# Patient Record
Sex: MALE | Race: WHITE | NOT HISPANIC OR LATINO | Employment: FULL TIME | URBAN - METROPOLITAN AREA
[De-identification: names, ages, dates, MRNs, and addresses within clinical notes are randomized per-mention and may not be internally consistent; named-entity substitution may affect disease eponyms.]

---

## 2022-08-30 ENCOUNTER — HOSPITAL ENCOUNTER (EMERGENCY)
Facility: CLINIC | Age: 27
Discharge: HOME | End: 2022-08-30
Attending: EMERGENCY MEDICINE | Admitting: EMERGENCY MEDICINE
Payer: COMMERCIAL

## 2022-08-30 VITALS
HEART RATE: 65 BPM | TEMPERATURE: 98.2 F | OXYGEN SATURATION: 100 % | DIASTOLIC BLOOD PRESSURE: 57 MMHG | RESPIRATION RATE: 18 BRPM | SYSTOLIC BLOOD PRESSURE: 124 MMHG

## 2022-08-30 DIAGNOSIS — S61.215A LACERATION OF LEFT RING FINGER WITHOUT FOREIGN BODY WITHOUT DAMAGE TO NAIL, INITIAL ENCOUNTER: ICD-10-CM

## 2022-08-30 PROCEDURE — 99283 EMERGENCY DEPT VISIT LOW MDM: CPT

## 2022-08-30 PROCEDURE — 12001 RPR S/N/AX/GEN/TRNK 2.5CM/<: CPT

## 2022-08-30 RX ORDER — LIDOCAINE HYDROCHLORIDE AND EPINEPHRINE 10; 10 MG/ML; UG/ML
INJECTION, SOLUTION INFILTRATION; PERINEURAL
Status: DISCONTINUED
Start: 2022-08-30 | End: 2022-08-30 | Stop reason: HOSPADM

## 2022-08-30 ASSESSMENT — ACTIVITIES OF DAILY LIVING (ADL): ADLS_ACUITY_SCORE: 35

## 2022-08-30 ASSESSMENT — ENCOUNTER SYMPTOMS: WOUND: 1

## 2022-08-30 NOTE — DISCHARGE INSTRUCTIONS
Please make an appointment to follow up with your primary care provider or the emergency department in 7 days for wound check and suture removal.     Return to ER immediately if you develop: signs of a wound infection (RED/SWOLLEN/DRAINS PUS LIKE A PIMPLE) OR you have any other concerns about your health.    Antibiotic ointment x 5 days         Discharge Instructions  Laceration (Cut)    You were seen today for a laceration (cut).  Your doctor examined your laceration for any problems such a buried foreign body (like glass, a splinter, or gravel), or injury to blood vessels, tendons, and nerves.  Your doctor may have also rinsed and/or scrubbed your laceration to help prevent an infection.  Your laceration may have been closed with glue, staples or sutures (stitches).      It may not be possible to find all problems with your laceration on the first visit, and we can't always prevent infections.  Antibiotics are only given when the benefit is more than the risk, and don't prevent all infections. Some lacerations are too high risk to close, and are left open to heal.  All lacerations, no matter how expertly repaired, will cause scarring.    Return to the Emergency Department right away if:  You have more redness, swelling, pain, drainage (pus), a bad smell, or red streaking from your laceration.    You have a fever of 101oF or more.  You have bleeding that you can t stop at home. If your cut starts to bleed, hold pressure on the bleeding area with a clean cloth or put pressure over the bandage.  If the bleeding doesn t stop after using constant pressure for 30 minutes, you should return to the Emergency Department for further treatment.  An area past the laceration is cool, pale, or blue compared with the other side, or has a slower return of color when squeezed.  Your dressing seems too tight or starts to get uncomfortable or painful.  You have loss of normal function or use of an area, such as being unable to  straighten or bend a finger normally.  You have a numb area past the laceration.    Return to the Emergency Department or see your regular doctor if:  The laceration starts to come open.   You have something coming out of the cut or a feeling that there is something in the laceration.  Your wound will not heal, or keeps breaking open. There can always be glass, wood, dirt or other things in any wound.  They won t always show up even on x-rays.  If a wound doesn t heal, this may be why, and it is important to follow-up with your regular doctor    Home Care:  Take your dressing off in 12 hours, or as instructed by your doctor, to check your laceration. Remove the dressing sooner if it seems too tight or painful, or if it is getting numb, tingly, or pale past the dressing.  Gently wash your laceration 2 times a day with clean cloth and soap.   It is okay to shower, but do not let the laceration soak in water.    If your laceration was closed with wound adhesive or strips: pat it dry and leave it open to the air.   For all other repairs: after you wash your laceration, or at least 2 times a day, apply bacitracin or other antibiotic ointment to the laceration, then cover it with a band-aid or gauze.  Keep the laceration clean. Wear gloves or other protective clothing if you are around dirt.    Follow-up:  You need to follow-up with your regular doctor in  7 days.  Your sutures or staples need to be removed in 7 days. Schedule an appointment with your regular doctor (or return to the Emergency Department) to have this done.    Scars:  To help minimize scarring:  Wear sunscreen over the healed laceration when out in the sun.  Massage the area regularly.  You may use Vitamin E Oil.  Wait a year.  Most scars will start to fade within a year.      Remember that you can always come back to the Emergency Department if you are not able to see your normal doctor in the amount of time listed above, if you get any new symptoms, or  if there is anything that worries you.

## 2022-08-30 NOTE — ED TRIAGE NOTES
Deep laceration to left middle finger. CSM intact. Gauze given to patient to control bleeding. Last tetanus shot in the last couple.

## 2022-08-30 NOTE — ED PROVIDER NOTES
Laceration Repair      Procedure: Laceration Repair    Indication: Laceration    Consent: Verbal    Location: Left L fourth (ring) finger    Length: 2.5 cm    Preparation: Irrigation with Sterile Saline.    Anesthesia/Sedation: Lidocaine - 1%      Treatment/Exploration: Wound explored, no foreign bodies found     Closure: The wound was closed with one layer. Skin/superficial layer was closed with 8 x 4-0 Nylon using Interrupted sutures.     Patient Status: The patient tolerated the procedure well: Yes. There were no complications.       Christiano Pedersen, PA-C  08/31/22 5715

## 2022-08-30 NOTE — ED PROVIDER NOTES
History   Chief Complaint:  Laceration     The history is provided by the patient.      Jered Scott is a 27 year old male who presents with laceration. The patient reports he cut his left ring finger with a knife accidentally about 4 hours ago. The patient reports he had his tetanus vaccine a couple years ago. He reports he has not had any stitches in the past.     Review of Systems   Skin: Positive for wound (L ring finger).   All other systems reviewed and are negative.        Allergies:   No Known Drug Allergies     Medications:  The patient is not currently taking any prescribed medications.    Past Medical History:     The patient denies past medical history.      Social History:  The patient presents to the ED with his male relative  PCP: Gera Peter, DO      Physical Exam     Patient Vitals for the past 24 hrs:   BP Temp Temp src Pulse Resp SpO2   08/30/22 1344 124/57 98.2  F (36.8  C) Temporal 65 18 100 %     Physical Exam    Gen: Resting comfortably   Eyes: Normal conjunctiva, No discharge    CV: ppi, regular   Resp: speaking in full sentences without any resp distress  Extremity: Left fourth finger midportion ulnar aspect there is a horizontally oriented 2.5 cm wound.  FDS FDP extensor tendon intact.  Distal sensation perfusion intact.  Explored to range of motion no foreign body found.  Skin: warm dry well perfused  Neuro: Alert, no gross motor or sensory deficits,  gait stable        Emergency Department Course     Procedures    Laceration Repair      Wound was repaired by Christiano Jade see his separate note      Emergency Department Course:     Reviewed:  I reviewed nursing notes, vitals, past medical history and Care Everywhere    Assessments:  1619 I obtained history and examined the patient as noted above.   1630 I rechecked the patient and explained findings.     Interventions:  Medications   lidocaine 1% with EPINEPHrine 1:100,000 1 %-1:[id number] injection (has no administration in time  range)     Disposition:  The patient was discharged to home.     Impression & Plan     Medical Decision Making:  Simple left fourth finger laceration.  No evidence of neurovascular or tendinous compromise.  No indication for radiograph.  Repair as referenced in Christiano Jade's note.  Patient will return here with new or worsening symptoms.  No indication for empiric antibiotics.  The patient presented with a laceration.  The wound was carefully evaluated and explored.  The laceration was closed with sutures/staples as noted above.  There is no evidence of muscular, tendon, or bony damage with this laceration.  No signs of foreign body.  Possible complications (infection, scarring) were reviewed with the patient.  Follow up with primary care will be indicated for suture/staple removal as noted in the discharge section.    Diagnosis:    ICD-10-CM    1. Laceration of left ring finger without foreign body without damage to nail, initial encounter  S61.215A        Discharge Medications:  New Prescriptions    No medications on file     Scribe Disclosure:  Patience SMITH, am serving as a scribe at 4:37 PM on 8/30/2022 to document services personally performed by Efren Garcia MD based on my observations and the provider's statements to me.            Efren Garcia MD  08/31/22 0154       Efren Garcia MD  08/31/22 0155